# Patient Record
(demographics unavailable — no encounter records)

---

## 2025-05-12 NOTE — ASSESSMENT
[FreeTextEntry1] : 32 RHM with history of cerebral aneurysm rupture s/p repair and subsequent epilepsy, without recent breakthrough seizures while on levetiracetam and lamotrigine,  Plan: amb EEG 24 hours neuropsych eval  levels today

## 2025-05-12 NOTE — HISTORY OF PRESENT ILLNESS
[FreeTextEntry1] : *** 05/09/2025 ***  CHRISTELLE LOPEZ is here for follow up. had several seizures due to " running out of medications" for several day.  no additional complains    *** 02/14/2025 ***  CHRISTELLE LOPEZ is here for initial eval and transferring care. history as abelow not interested in changing medications  claim compliance complains of memory loss   FROM 11/9/20: This is a 29-year-old right-handed man who states that on June 8, 2014, while living in [place], he had sudden onset of right-sided of headache and left-sided hemiparesis and numbness. He was found to have a ruptured cerebral aneurysm, and it was repaired emergently. During that hospital stay, he had a seizure, and was started on antiepileptic medications. He subsequently a physical therapy course for at least 1 month. That year, he moved to [place] and had a new cluster of seizures, and had to present to [facility]. He was established with neurologists based at [facility], but last saw them about 1 year ago. His last seizure occurred over 1 year ago, and involved a fall against a dresser with head trauma. When he moved to [place] and established with a new PCP, Dr. Raman Cordon, he was recommended to establish with [facility] for further care.  FROM 2/9/21: The patient reports sometimes feeling drowsy after he takes his antiepileptic medications, especially when he has not eaten much. He also occasionally experiences mood swings, but this has improved since he started taking his vitamin B6 supplement. He has not had any new seizures since the last clinic visit on 11/9/20. He notes that he sometimes forgets words that he has read. 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  FROM 6/14/21: The patient, now 30, states that since the last clinic visit, he has not had any breakthrough seizures nor any adverse reactions to levetiracetam or lamotrigine. He thinks that he has memory problems and has occasional brain fog. 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 He states that when he misses his medications sometimes, on average twice per week, he feels anxious and has palpitations, so he takes extra doses of medications when they are next due. He has been doing his daily activities independently. I was assisted in this evaluation by resident physician, Dr. Jan Burgos.  FROM 3/25/22: The patient states that he has not had any breakthrough seizures since I last saw him on 6/14/21, although he has intermittent twitches in the fifth digit of his right hand. He notes that sometimes feels down or sleepy but cannot clarify how long he has been feeling this way, or how often this happens.  FROM 7/29/22: The patient, now 31, has not had any breakthrough seizures since I last saw him on 3/25/22. He has occasional twitches in his right hand fifth digit, lasting only a few seconds at a time. He has been attempting to get a Medical bracelet since the last appointment, but he states that he was told that a direct letter needs to be faxed from this office.  FROM 5/2/23: Since I last saw the patient on 7/29/22, he has been experiencing a tingling sensation in his right wrist, and occasional "jumping" feeling in both hands (left more often than right), especially when he takes his antiepileptic medications late. He sometimes forgets to take his medications altogether but does not know how often this happens. He has been experiencing depressed mood, and states that he used to take antidepressants a few years ago but was weaned off of them around [id number] by a doctor based at [facility].  FROM 11/24/23: The patient, now 32, states that he is experiencing difficulty falling asleep, typically sleeping only 4-5 hours at a time, causing him to feel drowsy during the day although it is difficult for him to take a nap. He has been taking his other prescribed medications, although sometimes misses some doses, and occasionally feels shaking in the body for a few seconds at a time at random times in the day, but without loss of consciousness. He presented to [facility] last month because he had a breakthrough seizure when he ran out of medications but was able to be discharged after getting appropriate treatment.  FROM 4/11/24: Since I last saw the patient on 11/24/23, he has not had any breakthrough seizures as he continues to take Lamotrigine and Levetiracetam. He states that he continues to have poor short-term memory and depressed mood. He states that he saw a psychiatrist in [place] since I last saw him, but he does not recall the psychiatrist's name, location of practice, or date of this visit. He states that he was prescribed a medication. whose name he cannot remember, but it made him feel "off" so he stopped taking it.

## 2025-05-12 NOTE — PHYSICAL EXAM
[FreeTextEntry1] : Neurocognitive Examination Functionality Questionnaire  Relationship: Self Frequency of interactions in the past month: Daily / Lives with  [place]aker Index of [place] in Activities of Daily Livin. Bathing/Showerin 2. Dressin 3. Toiletin 4. Transferrin 5. Continence: 1 6: Feedin  TOTAL: 6   [place] Instrumental Activities of Daily Living: A. Ability to Use Telephone: 1 B. Shoppin C. Food Preparation: 1 D. Housekeepin (improving with physical activities) E. Laundry: 1 F. Transportation: 1 (arranges own travel; does not have a car to drive) G. Responsibility for Own Medications: 0 (friend helps give him his medications) H: Ability to Handle Finances: 1  TOTAL: 7  [General Appearance - Alert] : alert [General Appearance - In No Acute Distress] : in no acute distress [Impaired Insight] : insight and judgment were intact [Affect] : the affect was normal [Oriented to Person] : oriented to person [Oriented to Place] : disoriented to place [Oriented to Time] : disoriented to time [Person] : oriented to person [Place] : disoriented to place [Time] : oriented to time [Short Term Intact] : short term memory impaired [Concentration Intact] : normal concentrating ability [Naming Objects] : difficulty naming common objects [Repeating Phrases] : difficulty repeating a phrase [Fluency] : fluency not intact [Comprehension] : comprehension intact [Cranial Nerves Optic (II)] : visual acuity intact bilaterally,  visual fields full to confrontation, pupils equal round and reactive to light [Cranial Nerves Oculomotor (III)] : extraocular motion intact [Cranial Nerves Trigeminal (V)] : facial sensation intact symmetrically [Cranial Nerves Facial (VII)] : face symmetrical [Cranial Nerves Vestibulocochlear (VIII)] : hearing was intact bilaterally [Cranial Nerves Glossopharyngeal (IX)] : tongue and palate midline [Cranial Nerves Accessory (XI - Cranial And Spinal)] : head turning and shoulder shrug symmetric [Cranial Nerves Hypoglossal (XII)] : there was no tongue deviation with protrusion [Motor Strength] : muscle strength was normal in all four extremities [No Muscle Atrophy] : normal bulk in all four extremities [Motor Handedness Right-Handed] : the patient is right hand dominant [Paresis Pronator Drift Right-Sided] : no pronator drift on the right [Paresis Pronator Drift Left-Sided] : no pronator drift on the left [Motor Strength Upper Extremities Bilaterally] : strength was normal in both upper extremities [Motor Strength Lower Extremities Bilaterally] : strength was normal in both lower extremities [Sensation Tactile Decrease] : light touch was intact [Sensation Pain / Temperature Decrease] : pain and temperature was intact [Romberg's Sign] : Romberg's sign was negtive [Balance] : balance was intact [Past-pointing] : there was no past-pointing [Tremor] : no tremor present [Dysdiadochokinesia Bilaterally] : not present [Coordination - Dysmetria Impaired Finger-to-Nose Bilateral] : not present [Coordination - Dysmetria Impaired Heel-to-Shin Bilateral] : not present [2+] : Ankle jerk left 2+ [Plantar Reflex Right Only] : normal on the right [Plantar Reflex Left Only] : normal on the left [___] : absent on the right [___] : absent on the left [FreeTextEntry4] : Immediate recall: 3/3 (Banana, Chair, Blue). 5-minute delayed recall: 1/3 (recalls the other 2 words with MCQ cues). [FreeTextEntry8] : Normal, narrow-based gait. No difficulty with tiptoe, heel, and tandem gaits. [Sclera] : the sclera and conjunctiva were normal [PERRL With Normal Accommodation] : pupils were equal in size, round, reactive to light, with normal accommodation [Extraocular Movements] : extraocular movements were intact [Outer Ear] : the ears and nose were normal in appearance [Oropharynx] : the oropharynx was normal [Neck Appearance] : the appearance of the neck was normal [Auscultation Breath Sounds / Voice Sounds] : lungs were clear to auscultation bilaterally [Heart Rate And Rhythm] : heart rate was normal and rhythm regular [Heart Sounds] : normal S1 and S2 [Arterial Pulses Carotid] : carotid pulses were normal with no bruits [Full Pulse] : the pedal pulses are present [Abdomen Soft] : soft [Abdomen Tenderness] : non-tender [No CVA Tenderness] : no ~M costovertebral angle tenderness [No Spinal Tenderness] : no spinal tenderness [Abnormal Walk] : normal gait [Nail Clubbing] : no clubbing  or cyanosis of the fingernails [Musculoskeletal - Swelling] : no joint swelling seen [Motor Tone] : muscle strength and tone were normal [Skin Color & Pigmentation] : normal skin color and pigmentation [Skin Turgor] : normal skin turgor [] : no rash

## 2025-05-12 NOTE — PHYSICAL EXAM
[FreeTextEntry1] : Neurocognitive Examination Functionality Questionnaire  Relationship: Self Frequency of interactions in the past month: Daily / Lives with  Baker Index of [place] in Activities of Daily Livin. Bathing/Showerin 2. Dressin 3. Toiletin 4. Transferrin 5. Continence: 1 6: Feedin  TOTAL: 6   [place] Instrumental Activities of Daily Living: A. Ability to Use Telephone: 1 B. Shoppin C. Food Preparation: 1 D. Housekeepin (improving with physical activities) E. Laundry: 1 F. Transportation: 1 (arranges own travel; does not have a car to drive) G. Responsibility for Own Medications: 0 (friend helps give him his medications) H: Ability to Handle Finances: 1  TOTAL: 7  [General Appearance - Alert] : alert [General Appearance - In No Acute Distress] : in no acute distress [Impaired Insight] : insight and judgment were intact [Affect] : the affect was normal [Oriented to Person] : oriented to person [Oriented to Place] : disoriented to place [Oriented to Time] : disoriented to time [Person] : oriented to person [Place] : disoriented to place [Time] : oriented to time [Short Term Intact] : short term memory impaired [Concentration Intact] : normal concentrating ability [Naming Objects] : difficulty naming common objects [Repeating Phrases] : difficulty repeating a phrase [Fluency] : fluency not intact [Comprehension] : comprehension intact [Cranial Nerves Optic (II)] : visual acuity intact bilaterally,  visual fields full to confrontation, pupils equal round and reactive to light [Cranial Nerves Oculomotor (III)] : extraocular motion intact [Cranial Nerves Trigeminal (V)] : facial sensation intact symmetrically [Cranial Nerves Facial (VII)] : face symmetrical [Cranial Nerves Vestibulocochlear (VIII)] : hearing was intact bilaterally [Cranial Nerves Glossopharyngeal (IX)] : tongue and palate midline [Cranial Nerves Accessory (XI - Cranial And Spinal)] : head turning and shoulder shrug symmetric [Cranial Nerves Hypoglossal (XII)] : there was no tongue deviation with protrusion [Motor Strength] : muscle strength was normal in all four extremities [No Muscle Atrophy] : normal bulk in all four extremities [Motor Handedness Right-Handed] : the patient is right hand dominant [Paresis Pronator Drift Right-Sided] : no pronator drift on the right [Paresis Pronator Drift Left-Sided] : no pronator drift on the left [Motor Strength Upper Extremities Bilaterally] : strength was normal in both upper extremities [Motor Strength Lower Extremities Bilaterally] : strength was normal in both lower extremities [Sensation Tactile Decrease] : light touch was intact [Sensation Pain / Temperature Decrease] : pain and temperature was intact [Romberg's Sign] : Romberg's sign was negtive [Balance] : balance was intact [Past-pointing] : there was no past-pointing [Tremor] : no tremor present [Dysdiadochokinesia Bilaterally] : not present [Coordination - Dysmetria Impaired Finger-to-Nose Bilateral] : not present [Coordination - Dysmetria Impaired Heel-to-Shin Bilateral] : not present [2+] : Ankle jerk left 2+ [Plantar Reflex Right Only] : normal on the right [Plantar Reflex Left Only] : normal on the left [___] : absent on the right [___] : absent on the left [FreeTextEntry4] : Immediate recall: 3/3 (Banana, Chair, Blue). 5-minute delayed recall: 1/3 (recalls the other 2 words with MCQ cues). [FreeTextEntry8] : Normal, narrow-based gait. No difficulty with tiptoe, heel, and tandem gaits. [Sclera] : the sclera and conjunctiva were normal [PERRL With Normal Accommodation] : pupils were equal in size, round, reactive to light, with normal accommodation [Extraocular Movements] : extraocular movements were intact [Outer Ear] : the ears and nose were normal in appearance [Oropharynx] : the oropharynx was normal [Neck Appearance] : the appearance of the neck was normal [Auscultation Breath Sounds / Voice Sounds] : lungs were clear to auscultation bilaterally [Heart Rate And Rhythm] : heart rate was normal and rhythm regular [Heart Sounds] : normal S1 and S2 [Arterial Pulses Carotid] : carotid pulses were normal with no bruits [Full Pulse] : the pedal pulses are present [Abdomen Soft] : soft [Abdomen Tenderness] : non-tender [No CVA Tenderness] : no ~M costovertebral angle tenderness [No Spinal Tenderness] : no spinal tenderness [Abnormal Walk] : normal gait [Nail Clubbing] : no clubbing  or cyanosis of the fingernails [Musculoskeletal - Swelling] : no joint swelling seen [Motor Tone] : muscle strength and tone were normal [Skin Color & Pigmentation] : normal skin color and pigmentation [Skin Turgor] : normal skin turgor [] : no rash

## 2025-05-12 NOTE — HISTORY OF PRESENT ILLNESS
[FreeTextEntry1] : *** 05/09/2025 ***  CHRISTELLE LOPEZ is here for follow up. had several seizures due to " running out of medications" for several day.  no additional complains    *** 02/14/2025 ***  CHRISTELLE LOPEZ is here for initial eval and transferring care. history as abelow not interested in changing medications  claim compliance complains of memory loss   FROM 11/9/20: This is a 29-year-old right-handed man who states that on June 8, 2014, while living in [place], he had sudden onset of right-sided of headache and left-sided hemiparesis and numbness. He was found to have a ruptured cerebral aneurysm, and it was repaired emergently. During that hospital stay, he had a seizure, and was started on antiepileptic medications. He subsequently a physical therapy course for at least 1 month. That year, he moved to [place] and had a new cluster of seizures, and had to present to [facility]. He was established with neurologists based at [facility], but last saw them about 1 year ago. His last seizure occurred over 1 year ago, and involved a fall against a dresser with head trauma. When he moved to [place] and established with a new PCP, Dr. Raman Cordon, he was recommended to establish with [facility] for further care.  FROM 2/9/21: The patient reports sometimes feeling drowsy after he takes his antiepileptic medications, especially when he has not eaten much. He also occasionally experiences mood swings, but this has improved since he started taking his vitamin B6 supplement. He has not had any new seizures since the last clinic visit on 11/9/20. He notes that he sometimes forgets words that he has read. 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  FROM 6/14/21: The patient, now 30, states that since the last clinic visit, he has not had any breakthrough seizures nor any adverse reactions to levetiracetam or lamotrigine. He thinks that he has memory problems and has occasional brain fog. 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 He states that when he misses his medications sometimes, on average twice per week, he feels anxious and has palpitations, so he takes extra doses of medications when they are next due. He has been doing his daily activities independently. I was assisted in this evaluation by resident physician, Dr. Jan Burgos.  FROM 3/25/22: The patient states that he has not had any breakthrough seizures since I last saw him on 6/14/21, although he has intermittent twitches in the fifth digit of his right hand. He notes that sometimes feels down or sleepy but cannot clarify how long he has been feeling this way, or how often this happens.  FROM 7/29/22: The patient, now 31, has not had any breakthrough seizures since I last saw him on 3/25/22. He has occasional twitches in his right hand fifth digit, lasting only a few seconds at a time. He has been attempting to get a Medical bracelet since the last appointment, but he states that he was told that a direct letter needs to be faxed from this office.  FROM 5/2/23: Since I last saw the patient on 7/29/22, he has been experiencing a tingling sensation in his right wrist, and occasional "jumping" feeling in both hands (left more often than right), especially when he takes his antiepileptic medications late. He sometimes forgets to take his medications altogether but does not know how often this happens. He has been experiencing depressed mood, and states that he used to take antidepressants a few years ago but was weaned off of them around [id number] by a doctor based at [facility].  FROM 11/24/23: The patient, now 32, states that he is experiencing difficulty falling asleep, typically sleeping only 4-5 hours at a time, causing him to feel drowsy during the day although it is difficult for him to take a nap. He has been taking his other prescribed medications, although sometimes misses some doses, and occasionally feels shaking in the body for a few seconds at a time at random times in the day, but without loss of consciousness. He presented to [facility] last month because he had a breakthrough seizure when he ran out of medications but was able to be discharged after getting appropriate treatment.  FROM 4/11/24: Since I last saw the patient on 11/24/23, he has not had any breakthrough seizures as he continues to take Lamotrigine and Levetiracetam. He states that he continues to have poor short-term memory and depressed mood. He states that he saw a psychiatrist in [place] since I last saw him, but he does not recall the psychiatrist's name, location of practice, or date of this visit. He states that he was prescribed a medication. whose name he cannot remember, but it made him feel "off" so he stopped taking it.

## 2025-05-12 NOTE — DATA REVIEWED
[de-identified] : Routine EEG (11/24/20): Normal awake study  [de-identified] : Neuropsychological Testing (8/17/21 & 8/24/21): - Nondiagnostic test for cognitive impairment [de-identified] : Labs (10/6/20): - CBC, CMP, Lipid panel: Normal - Levetiracetam level 19.3 (Normal) - Lamotrigine level 0.7 (Low)  CT Head & CTA Head/Neck (11/24/20): As above, - No acute intracranial structural abnormality - No intracranial or neck vessel abnormality - S/p right frontoparietal craniotomy - Subjacent metallic clip at high right paramedian frontal lobe - Right frontal encephalomalacia and gliosis

## 2025-05-12 NOTE — DATA REVIEWED
[de-identified] : Routine EEG (11/24/20): Normal awake study  [de-identified] : Neuropsychological Testing (8/17/21 & 8/24/21): - Nondiagnostic test for cognitive impairment [de-identified] : Labs (10/6/20): - CBC, CMP, Lipid panel: Normal - Levetiracetam level 19.3 (Normal) - Lamotrigine level 0.7 (Low)  CT Head & CTA Head/Neck (11/24/20): As above, - No acute intracranial structural abnormality - No intracranial or neck vessel abnormality - S/p right frontoparietal craniotomy - Subjacent metallic clip at high right paramedian frontal lobe - Right frontal encephalomalacia and gliosis